# Patient Record
Sex: FEMALE | Race: AMERICAN INDIAN OR ALASKA NATIVE | NOT HISPANIC OR LATINO | Employment: UNEMPLOYED | URBAN - METROPOLITAN AREA
[De-identification: names, ages, dates, MRNs, and addresses within clinical notes are randomized per-mention and may not be internally consistent; named-entity substitution may affect disease eponyms.]

---

## 2019-10-02 PROBLEM — Z80.0 FAMILY HX OF COLON CANCER: Status: ACTIVE | Noted: 2019-10-02

## 2021-02-28 PROBLEM — R07.9 CHEST PAIN: Status: ACTIVE | Noted: 2021-02-28

## 2022-06-07 PROBLEM — Z17.31 HER2-POSITIVE CARCINOMA OF LEFT BREAST: Status: ACTIVE | Noted: 2022-06-07

## 2022-06-07 PROBLEM — C50.912 HER2-POSITIVE CARCINOMA OF LEFT BREAST: Status: ACTIVE | Noted: 2022-06-07

## 2022-06-07 PROBLEM — C50.412 MALIGNANT NEOPLASM OF UPPER-OUTER QUADRANT OF LEFT BREAST IN FEMALE, ESTROGEN RECEPTOR POSITIVE: Status: ACTIVE | Noted: 2022-06-07

## 2022-06-07 PROBLEM — Z17.0 MALIGNANT NEOPLASM OF UPPER-OUTER QUADRANT OF LEFT BREAST IN FEMALE, ESTROGEN RECEPTOR POSITIVE: Status: ACTIVE | Noted: 2022-06-07

## 2022-09-14 PROBLEM — E78.5 HLD (HYPERLIPIDEMIA): Status: ACTIVE | Noted: 2022-09-14

## 2022-09-14 PROBLEM — E03.9 HYPOTHYROID: Status: ACTIVE | Noted: 2022-09-14

## 2022-09-14 PROBLEM — N76.4 LABIAL ABSCESS: Status: ACTIVE | Noted: 2022-09-14

## 2022-09-14 PROBLEM — I10 HTN (HYPERTENSION): Status: ACTIVE | Noted: 2022-09-14

## 2022-09-14 PROBLEM — N17.9 AKI (ACUTE KIDNEY INJURY): Status: ACTIVE | Noted: 2022-09-14

## 2022-09-16 PROBLEM — N17.9 AKI (ACUTE KIDNEY INJURY): Status: RESOLVED | Noted: 2022-09-14 | Resolved: 2022-09-16

## 2022-09-27 PROBLEM — E83.42 HYPOMAGNESEMIA: Status: ACTIVE | Noted: 2022-09-27

## 2022-10-31 ENCOUNTER — TELEPHONE (OUTPATIENT)
Dept: PLASTIC SURGERY | Facility: CLINIC | Age: 67
End: 2022-10-31
Payer: MEDICAID

## 2022-10-31 NOTE — TELEPHONE ENCOUNTER
Attempted to contact pt to schedule consultation.  Pt was not available at the time of call. I left a detailed VM asking pt to call PLS office at her convenience.

## 2022-11-02 ENCOUNTER — DOCUMENTATION ONLY (OUTPATIENT)
Dept: HEMATOLOGY/ONCOLOGY | Facility: CLINIC | Age: 67
End: 2022-11-02
Payer: MEDICAID

## 2022-11-02 ENCOUNTER — PATIENT MESSAGE (OUTPATIENT)
Dept: HEMATOLOGY/ONCOLOGY | Facility: CLINIC | Age: 67
End: 2022-11-02
Payer: MEDICAID

## 2022-11-02 DIAGNOSIS — C50.412 MALIGNANT NEOPLASM OF UPPER-OUTER QUADRANT OF LEFT BREAST IN FEMALE, ESTROGEN RECEPTOR POSITIVE: Primary | ICD-10-CM

## 2022-11-02 DIAGNOSIS — Z17.0 MALIGNANT NEOPLASM OF UPPER-OUTER QUADRANT OF LEFT BREAST IN FEMALE, ESTROGEN RECEPTOR POSITIVE: Primary | ICD-10-CM

## 2022-11-02 NOTE — NURSING
Unable to reach pt by phone, I sent a portal message reviewing the dates, locations, and times of the pt's upcoming appointments.  I provided my direct contact information and encouraged the pt to call for any assistance.    Oncology Navigation   Intake  Date of Diagnosis: 05/13/22  Cancer Type: Breast  Internal / External Referral: External  Date of Referral: 10/24/22  Date Worked: 11/02/22  First Appointment Available: 11/10/22  Appointment Date: 11/10/22  First Available Date vs. Scheduled Date (days): 0     Treatment     Surgical Oncologist: Anne  Plastic Surgeon: Yevgeniy  Consult Date: 11/10/22    Medical Oncologist: Ifeanyi  Chemotherapy: Completed       Procedures: MRI  MRI Schedule Date: 11/09/22       ER: Positive  FL: Positive  Her2: Postive           Acuity      Follow Up  No follow-ups on file.

## 2022-11-09 ENCOUNTER — HOSPITAL ENCOUNTER (OUTPATIENT)
Dept: RADIOLOGY | Facility: OTHER | Age: 67
Discharge: HOME | End: 2022-11-09
Attending: SURGERY
Payer: MEDICAID

## 2022-11-09 DIAGNOSIS — Z17.0 MALIGNANT NEOPLASM OF UPPER-OUTER QUADRANT OF LEFT BREAST IN FEMALE, ESTROGEN RECEPTOR POSITIVE: ICD-10-CM

## 2022-11-09 DIAGNOSIS — C50.412 MALIGNANT NEOPLASM OF UPPER-OUTER QUADRANT OF LEFT BREAST IN FEMALE, ESTROGEN RECEPTOR POSITIVE: ICD-10-CM

## 2022-11-09 PROCEDURE — 77049 MRI BREAST C-+ W/CAD BI: CPT | Mod: TC

## 2022-11-09 PROCEDURE — 25500020 PHARM REV CODE 255: Performed by: SURGERY

## 2022-11-09 PROCEDURE — 77049 MRI BREAST W/WO CONTRAST, W/CAD, BILATERAL: ICD-10-PCS | Mod: 26,,, | Performed by: RADIOLOGY

## 2022-11-09 PROCEDURE — 77049 MRI BREAST C-+ W/CAD BI: CPT | Mod: 26,,, | Performed by: RADIOLOGY

## 2022-11-09 PROCEDURE — A9577 INJ MULTIHANCE: HCPCS | Performed by: SURGERY

## 2022-11-09 RX ADMIN — GADOBENATE DIMEGLUMINE 14 ML: 529 INJECTION, SOLUTION INTRAVENOUS at 11:11

## 2022-11-10 ENCOUNTER — OFFICE VISIT (OUTPATIENT)
Dept: PLASTIC SURGERY | Facility: CLINIC | Age: 67
End: 2022-11-10
Payer: MEDICAID

## 2022-11-10 ENCOUNTER — OFFICE VISIT (OUTPATIENT)
Dept: SURGERY | Facility: CLINIC | Age: 67
End: 2022-11-10
Payer: MEDICAID

## 2022-11-10 VITALS
DIASTOLIC BLOOD PRESSURE: 67 MMHG | HEART RATE: 78 BPM | BODY MASS INDEX: 26.33 KG/M2 | SYSTOLIC BLOOD PRESSURE: 158 MMHG | WEIGHT: 158 LBS | HEIGHT: 65 IN

## 2022-11-10 VITALS
HEART RATE: 76 BPM | BODY MASS INDEX: 26.66 KG/M2 | SYSTOLIC BLOOD PRESSURE: 151 MMHG | DIASTOLIC BLOOD PRESSURE: 67 MMHG | WEIGHT: 160 LBS | HEIGHT: 65 IN

## 2022-11-10 DIAGNOSIS — Z17.0 MALIGNANT NEOPLASM OF UPPER-OUTER QUADRANT OF LEFT BREAST IN FEMALE, ESTROGEN RECEPTOR POSITIVE: ICD-10-CM

## 2022-11-10 DIAGNOSIS — C50.812 MALIGNANT NEOPLASM OF OVERLAPPING SITES OF LEFT BREAST IN FEMALE, ESTROGEN RECEPTOR POSITIVE: Primary | ICD-10-CM

## 2022-11-10 DIAGNOSIS — C50.412 MALIGNANT NEOPLASM OF UPPER-OUTER QUADRANT OF LEFT BREAST IN FEMALE, ESTROGEN RECEPTOR POSITIVE: ICD-10-CM

## 2022-11-10 DIAGNOSIS — Z17.0 MALIGNANT NEOPLASM OF OVERLAPPING SITES OF LEFT BREAST IN FEMALE, ESTROGEN RECEPTOR POSITIVE: Primary | ICD-10-CM

## 2022-11-10 PROCEDURE — 3078F DIAST BP <80 MM HG: CPT | Mod: CPTII,,, | Performed by: SURGERY

## 2022-11-10 PROCEDURE — 3288F PR FALLS RISK ASSESSMENT DOCUMENTED: ICD-10-PCS | Mod: CPTII,,, | Performed by: SURGERY

## 2022-11-10 PROCEDURE — 3008F BODY MASS INDEX DOCD: CPT | Mod: CPTII,,, | Performed by: SURGERY

## 2022-11-10 PROCEDURE — 3077F SYST BP >= 140 MM HG: CPT | Mod: CPTII,,, | Performed by: SURGERY

## 2022-11-10 PROCEDURE — 4010F ACE/ARB THERAPY RXD/TAKEN: CPT | Mod: CPTII,,, | Performed by: SURGERY

## 2022-11-10 PROCEDURE — 4010F PR ACE/ARB THEARPY RXD/TAKEN: ICD-10-PCS | Mod: CPTII,,, | Performed by: SURGERY

## 2022-11-10 PROCEDURE — 99204 OFFICE O/P NEW MOD 45 MIN: CPT | Mod: S$PBB,,, | Performed by: SURGERY

## 2022-11-10 PROCEDURE — 99999 PR PBB SHADOW E&M-EST. PATIENT-LVL III: CPT | Mod: PBBFAC,,, | Performed by: SURGERY

## 2022-11-10 PROCEDURE — 3044F HG A1C LEVEL LT 7.0%: CPT | Mod: CPTII,,, | Performed by: SURGERY

## 2022-11-10 PROCEDURE — 99999 PR PBB SHADOW E&M-EST. PATIENT-LVL III: ICD-10-PCS | Mod: PBBFAC,,, | Performed by: SURGERY

## 2022-11-10 PROCEDURE — 99205 OFFICE O/P NEW HI 60 MIN: CPT | Mod: S$PBB,,, | Performed by: SURGERY

## 2022-11-10 PROCEDURE — 3288F FALL RISK ASSESSMENT DOCD: CPT | Mod: CPTII,,, | Performed by: SURGERY

## 2022-11-10 PROCEDURE — 1126F PR PAIN SEVERITY QUANTIFIED, NO PAIN PRESENT: ICD-10-PCS | Mod: CPTII,,, | Performed by: SURGERY

## 2022-11-10 PROCEDURE — 3078F PR MOST RECENT DIASTOLIC BLOOD PRESSURE < 80 MM HG: ICD-10-PCS | Mod: CPTII,,, | Performed by: SURGERY

## 2022-11-10 PROCEDURE — 99999 PR PBB SHADOW E&M-EST. PATIENT-LVL IV: ICD-10-PCS | Mod: PBBFAC,,, | Performed by: SURGERY

## 2022-11-10 PROCEDURE — 1126F AMNT PAIN NOTED NONE PRSNT: CPT | Mod: CPTII,,, | Performed by: SURGERY

## 2022-11-10 PROCEDURE — 99205 PR OFFICE/OUTPT VISIT, NEW, LEVL V, 60-74 MIN: ICD-10-PCS | Mod: S$PBB,,, | Performed by: SURGERY

## 2022-11-10 PROCEDURE — 3044F PR MOST RECENT HEMOGLOBIN A1C LEVEL <7.0%: ICD-10-PCS | Mod: CPTII,,, | Performed by: SURGERY

## 2022-11-10 PROCEDURE — 99213 OFFICE O/P EST LOW 20 MIN: CPT | Mod: PBBFAC | Performed by: SURGERY

## 2022-11-10 PROCEDURE — 99999 PR PBB SHADOW E&M-EST. PATIENT-LVL IV: CPT | Mod: PBBFAC,,, | Performed by: SURGERY

## 2022-11-10 PROCEDURE — 99204 PR OFFICE/OUTPT VISIT, NEW, LEVL IV, 45-59 MIN: ICD-10-PCS | Mod: S$PBB,,, | Performed by: SURGERY

## 2022-11-10 PROCEDURE — 3077F PR MOST RECENT SYSTOLIC BLOOD PRESSURE >= 140 MM HG: ICD-10-PCS | Mod: CPTII,,, | Performed by: SURGERY

## 2022-11-10 PROCEDURE — 3008F PR BODY MASS INDEX (BMI) DOCUMENTED: ICD-10-PCS | Mod: CPTII,,, | Performed by: SURGERY

## 2022-11-10 PROCEDURE — 1101F PR PT FALLS ASSESS DOC 0-1 FALLS W/OUT INJ PAST YR: ICD-10-PCS | Mod: CPTII,,, | Performed by: SURGERY

## 2022-11-10 PROCEDURE — 1101F PT FALLS ASSESS-DOCD LE1/YR: CPT | Mod: CPTII,,, | Performed by: SURGERY

## 2022-11-10 PROCEDURE — 99214 OFFICE O/P EST MOD 30 MIN: CPT | Mod: PBBFAC,27 | Performed by: SURGERY

## 2022-11-10 NOTE — PROGRESS NOTES
Plastic Surgery History & Physical    SUBJECTIVE:   Chief complaint:  Discuss breast reconstruction    History of Present Illness:  67 y.o. female with left breast cancer.  She initially presented with a palpable breast mass over the summer.  She states that she completed her chemotherapy about 1 month ago.  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  She otherwise states that she tolerated chemotherapy fairly well.  She is seeing Dr. Rodríguez later today to discuss her surgical options.  All of her care at this point has been done in [place].  She also complains of bilateral axillary breast tissue.  I told her I would address that at the time of reconstruction is more incisions could compromise the breast skin at the time of reconstruction    Past medical history includes diabetes.  Her last hemoglobin A1c was 6.  She also has hypertension and coronary artery disease.  She takes a baby aspirin daily.  She states that she has a stent placed after heart catheterization for symptoms about 18 months ago.    Past surgical history includes thyroidectomy, cholecystectomy, bladder suspension.  She denies any nicotine use    Past Medical History:   Diagnosis Date    Angina pectoris     Breast cancer     Diabetes     Diverticulosis     GERD (gastroesophageal reflux disease)     History of postoperative nausea and vomiting     Hypertension     Palpitation     PONV (postoperative nausea and vomiting)     Sleep apnea     Thyroid disease        Past Surgical History:   Procedure Laterality Date    BLADDER SUSPENSION      CHOLECYSTECTOMY      [place]    [facility] [place] poly[place] per patient    COLONOSCOPY N/A 10/02/2019    Procedure: COLONOSCOPY;  Surgeon: Nilsa Stanley MD;  Location: [facility];  Service: Endoscopy;  Laterality: N/A;    ESOPHAGOGASTRODUODENOSCOPY      ESOPHAGUS SURGERY      nissen    HIATAL HERNIA REPAIR       INSERTION OF TUNNELED CENTRAL VENOUS CATHETER (CVC) WITH SUBCUTANEOUS PORT Right 2022    Procedure: [id number];  Surgeon: Simon Moses MD;  Location: [facility];  Service: General;  Laterality: Right;  right IJ    PYLOROPLASTY      THYROIDECTOMY         Family History   Problem Relation Age of Onset    Colon cancer Sister         Initial dx-30's-  @ age 53 of metastatic colon cancer        Social History     Socioeconomic History    Marital status:    Tobacco Use    Smoking status: Never    Smokeless tobacco: Never   Substance and Sexual Activity    Alcohol use: No    Drug use: No       Current Outpatient Medications   Medication Sig Dispense Refill    aspirin 81 MG Chew Take 1 tablet (81 mg total) by mouth once daily. 30 tablet 0    dexAMETHasone (DECADRON) 4 MG Tab Take 1 tablet (4 mg total) by mouth 2 (two) times a day. Take 2 tabs day 2, 3 after chemotherapy (Patient not taking: Reported on 11/10/2022) 24 tablet 1    diphenoxylate-atropine 2.5-0.025 mg (LOMOTIL) 2.5-0.025 mg per tablet Take 1 tablet by mouth 2 (two) times daily as needed for Diarrhea (For diarrhea unimproved by imodium). (Patient not taking: Reported on 11/10/2022) 30 tablet 1    levothyroxine (SYNTHROID) 75 MCG tablet Take 75 mcg by mouth once daily.      lisinopriL-hydrochlorothiazide (ZESTORETIC) 10-12.5 mg per tablet Take 1 tablet by mouth once daily. 30 tablet 11    magnesium oxide (MAGOX) 400 mg (241.3 mg magnesium) tablet Take 1 tablet (400 mg total) by mouth 2 (two) times daily. 200 tablet 1    metformin (GLUCOPHAGE-XR) 500 MG 24 hr tablet Take 500 mg by mouth 2 (two) times a day.       metoprolol succinate (TOPROL-XL) 100 MG 24 hr tablet Take 50 mg by mouth once daily.      nitroGLYCERIN (NITROSTAT) 0.4 MG SL tablet Place 0.4 mg under the tongue every 5 (five) minutes as needed for Chest pain.      ondansetron (ZOFRAN-ODT) 8 MG TbDL Take 1 tablet (8 mg total) by mouth every 8 (eight) hours as needed  "(nausea/vomiting). 60 tablet 5    oxyCODONE (OXY-IR) 5 mg Cap Take 1 capsule (5 mg total) by mouth every 6 (six) hours as needed for Pain. (Patient not taking: Reported on 11/10/2022) 20 capsule 0    pantoprazole (PROTONIX) 40 MG tablet Take 40 mg by mouth once daily.      vitamin D (VITAMIN D3) 1000 units Tab Take 5,000 Units by mouth once daily.      atorvastatin (LIPITOR) 20 MG tablet Take 1 tablet (20 mg total) by mouth once daily. (Patient not taking: Reported on 10/31/2022) 90 tablet 3     No current facility-administered medications for this visit.       Review of patient's allergies indicates:   Allergen Reactions    Adhesive tape-silicones      Other reaction(s): Propensity to adverse reactions to drug    Pravastatin      Other reaction(s): Propensity to adverse reactions to drug         Review of Systems:  Review of Systems   Respiratory:  Negative for chest tightness and shortness of breath.    Cardiovascular:  Negative for chest pain and palpitations.   Neurological:  Positive for numbness.         OBJECTIVE:     BP (!) 158/67 (BP Location: Right arm, Patient Position: Sitting, BP Method: Medium (Automatic))   Pulse 78   Ht 5' 5" (1.651 m)   Wt 71.7 kg (158 lb)   BMI 26.29 kg/m²       Physical Exam:  Gen: NAD, appears stated age  Neuro: normal without focal findings, mental status and speech normal  HEENT: NCAT, neck supple, PEERL  CV: RRR  Pulm: Breathing non-labored, chest wall movement equal bilaterally   Breast:  Bilateral grade 3 ptosis.  No palpable masses.  Bilateral ptotic axillary breast tissue   Right Left   SN-N 31 29   N-IMF 10 10   N-N 16   BW         Abdomen: soft, nontender, no guarding  Gu: genitalia not examined  Extremity:normal strength, no cyanosis or edema  Skin: Skin color, texture, turgor normal. No rashes or lesions  Psych: oriented to time, place and person, mood and affect are within normal limits          ASSESSMENT/PLAN:     Malignant neoplasm of upper-outer quadrant of " left breast in female, estrogen receptor positive    Began with discussion of implant based reconstruction. This included both direct-to-implant, tissue expander based reconstruction, the possible use of ADM, drains and expected post-operative course.  Risks of implant based recon including: hematoma, seroma, infection, extrusion, capsular contracture, risks of rupture, need for replacement later in life and BII/MARIMAR-ALCL. Discussed basics of revision procedures including: tailoring of the skin envelope, repositioning of the implant, liposuction and fat grafting. Any questions answered.    Next we discussed autologous reconstruction with the most common donor sites (abdomen, thigh, back). Explained that given her heart history I would not recommend autologous free flap breast reconstruction.  The reason being that the vessel needed for left breast reconstruction is also a good vessel in the event that she later needs a bypass.  She would a stent placed about 18 months ago.    Finally, we discussed oncoplastic breast reduction with typical postop pretty course and risks of breast reduction.     I recommended 1 of 2 options.  She would like a mastectomy she did have a implant based breast reconstruction, beginning with a tissue expander for skin reduction and a contralateral breast reduction.  She would also be a candidate for oncologic breast reduction.  She is seeing Dr. Rodríguez later today and will consider her reconstructive options.      Marquis Vuong, DO  Plastic and Reconstructive Surgery    I spent a total of 45 minutes on the day of the visit.This includes face to face time and non-face to face time preparing to see the patient (eg, review of tests), obtaining and/or reviewing separately obtained history, documenting clinical information in the electronic or other health record, independently interpreting results and communicating results to the patient/family/caregiver, or care coordinator.

## 2022-11-10 NOTE — PROGRESS NOTES
Breast Surgery  [facility]  Department of Surgery      REFERRING PROVIDER: Aaareferral Self  No address on file    Chief Complaint: Breast Mass (NP Post chemo from [facility] )      Subjective:      Patient ID: Ermias Herman is a 67 y.o. female who presents with triple + IDC of the left breast. All work up performed at [facility].     Patient states this issue was first found as a palpable abnormality on self exam. Diagnostic mammogram/US (5/3/22) showed pleomorphic calcifications as well a 3 cm area of asymmetry which corresponded to patient's palpable finding. A ultrasound guided biopsy was performed on 22 with pathology revealing infiltrating ductal carcinoma of the breast, triple positive.     Patient underwent NACT at [facility] with [facility] from 22 - 10/18/22. Patient was referred to our clinic to discuss surgical options. Patient met with Dr. Vuong in plastic surgery prior to our visit.     Patient has now completed chemotherapy. Patient states the palpable mass on self exam has improved. Patient denies nipple discharge. Patient denies to previous breast biopsy. Patient denies a personal history of breast cancer.    Findings at that time were the following:   Tumor size: 3 cm   Estrogen Receptor: +   Progesterone Receptor: +   Her-2 kassie: +   Lymph node status: clinically negative    Lymphatic invasion: not identified     GYN History:  Age of menarche was 12.   Age of menopause was 52.    Patient denies hormonal therapy.   Patient is .   Age of first live birth was 23.   Patient did not use hormone therapy   Patient did not breast feed.    Past Medical History:   Diagnosis Date    Angina pectoris     Breast cancer     Diabetes     Diverticulosis     GERD (gastroesophageal reflux disease)     History of postoperative nausea and vomiting     Hypertension     Palpitation     PONV (postoperative nausea and vomiting)     Sleep apnea     Thyroid disease      Past Surgical History:   Procedure  Laterality Date    BLADDER SUSPENSION      CHOLECYSTECTOMY      [place]    [facility] [place]    COLONOSCOPY N/A 10/02/2019    Procedure: COLONOSCOPY;  Surgeon: Nilsa Stanley MD;  Location: [facility];  Service: Endoscopy;  Laterality: N/A;    ESOPHAGOGASTRODUODENOSCOPY      ESOPHAGUS SURGERY      nissen    HIATAL HERNIA REPAIR      INSERTION OF TUNNELED CENTRAL VENOUS CATHETER (CVC) WITH SUBCUTANEOUS PORT Right 6/21/2022    Procedure: [id number];  Surgeon: Simon Moses MD;  Location: [facility];  Service: General;  Laterality: Right;  right IJ    PYLOROPLASTY      THYROIDECTOMY       Current Outpatient Medications on File Prior to Visit   Medication Sig Dispense Refill    aspirin 81 MG Chew Take 1 tablet (81 mg total) by mouth once daily. 30 tablet 0    levothyroxine (SYNTHROID) 75 MCG tablet Take 75 mcg by mouth once daily.      lisinopriL-hydrochlorothiazide (ZESTORETIC) 10-12.5 mg per tablet Take 1 tablet by mouth once daily. 30 tablet 11    magnesium oxide (MAGOX) 400 mg (241.3 mg magnesium) tablet Take 1 tablet (400 mg total) by mouth 2 (two) times daily. 200 tablet 1    metformin (GLUCOPHAGE-XR) 500 MG 24 hr tablet Take 500 mg by mouth 2 (two) times a day.       metoprolol succinate (TOPROL-XL) 100 MG 24 hr tablet Take 50 mg by mouth once daily.      nitroGLYCERIN (NITROSTAT) 0.4 MG SL tablet Place 0.4 mg under the tongue every 5 (five) minutes as needed for Chest pain.      ondansetron (ZOFRAN-ODT) 8 MG TbDL Take 1 tablet (8 mg total) by mouth every 8 (eight) hours as needed (nausea/vomiting). 60 tablet 5    pantoprazole (PROTONIX) 40 MG tablet Take 40 mg by mouth once daily.      vitamin D (VITAMIN D3) 1000 units Tab Take 5,000 Units by mouth once daily.      atorvastatin (LIPITOR) 20 MG tablet Take 1 tablet (20 mg total) by mouth once daily. (Patient not taking: Reported on 10/31/2022) 90 tablet 3    dexAMETHasone (DECADRON) 4 MG Tab Take 1 tablet (4  "mg total) by mouth 2 (two) times a day. Take 2 tabs day 2, 3 after chemotherapy (Patient not taking: Reported on 11/10/2022) 24 tablet 1    diphenoxylate-atropine 2.5-0.025 mg (LOMOTIL) 2.5-0.025 mg per tablet Take 1 tablet by mouth 2 (two) times daily as needed for Diarrhea (For diarrhea unimproved by imodium). (Patient not taking: Reported on 11/10/2022) 30 tablet 1    oxyCODONE (OXY-IR) 5 mg Cap Take 1 capsule (5 mg total) by mouth every 6 (six) hours as needed for Pain. (Patient not taking: Reported on 11/10/2022) 20 capsule 0     No current facility-administered medications on file prior to visit.     Social History     Socioeconomic History    Marital status:    Tobacco Use    Smoking status: Never    Smokeless tobacco: Never   Substance and Sexual Activity    Alcohol use: No    Drug use: No     Family History   Problem Relation Age of Onset    Colon cancer Sister         Initial dx-30's-  @ age 53 of metastatic colon cancer         Review of Systems   Constitutional:  Negative for appetite change, chills, fever and unexpected weight change.   HENT:  Negative for facial swelling, postnasal drip and sore throat.    Eyes:  Negative for redness and itching.   Respiratory:  Negative for chest tightness and shortness of breath.    Cardiovascular:  Negative for chest pain and palpitations.   Gastrointestinal:  Negative for blood in stool, diarrhea, nausea and vomiting.   Genitourinary:  Negative for difficulty urinating and dysuria.   Musculoskeletal:  Negative for arthralgias and joint swelling.   Skin:  Negative for rash and wound.   Neurological:  Negative for dizziness and syncope.   Hematological:  Negative for adenopathy.   Psychiatric/Behavioral:  Negative for agitation. The patient is not nervous/anxious.    Objective:   BP (!) 151/67 (BP Location: Right arm, Patient Position: Sitting, BP Method: Small (Automatic))   Pulse 76   Ht 5' 5" (1.651 m)   Wt 72.6 kg (160 lb)   BMI 26.63 kg/m² "     Physical Exam   Vitals reviewed.  Constitutional: She is oriented to person, place, and time.   HENT:   Head: Normocephalic and atraumatic.   Nose: Nose normal.   Eyes: Pupils are equal, round, and reactive to light. Right eye exhibits no discharge. Left eye exhibits no discharge.   Pulmonary/Chest: Effort normal and breath sounds normal. No stridor. No respiratory distress. She exhibits no mass, no tenderness and no edema. Right breast exhibits no inverted nipple, no mass, no nipple discharge, no skin change and no tenderness. Left breast exhibits no inverted nipple, no mass, no nipple discharge, no skin change and no tenderness. No breast swelling or bleeding. Breasts are symmetrical.       Abdominal: Normal appearance.   Genitourinary: No breast swelling or bleeding.   Neurological: She is alert and oriented to person, place, and time.   Skin: Skin is warm and dry.     Psychiatric: Her behavior is normal. Mood, judgment and thought content normal.     Radiology review: Images personally reviewed by me in the clinic.     11/9/22 MRI Breast:     Findings:  The breasts have heterogeneous fibroglandular tissue. The background parenchymal enhancement is moderate.      Left  At the site of the known cancer, there is a heterogeneous, faint area of focal non-mass enhancement in the upper outer quadrant of the left breast at 1 o'clock in the middle depth, 6 cm from the nipple, 4.3 cm from the skin, and 5.9 cm from the chest wall. There is also a 7 x 8 x 6 mm oval rim enhancing mass in the left breast upper inner quadrant middle depth. This does not demonstrate central enhancement.  Kinetics initial phase is slow. Delayed phase is persistent. Based on the notes in the EHR, the patient has elected to undergo left mastectomy.      Right  There is a 4 mm x 14 mm x 9 mm non-mass enhancement in a linear distribution seen in the right breast at 6 o'clock in the anterior depth, 3.2 cm from the nipple, 3 cm from the skin, and  8.5 cm from the chest wall. Kinetics initial phase is fast. Delayed phase is washout.      There is a 5 mm x 5 mm x 7 mm round mass with circumscribed margins and rim enhancement seen in the upper outer quadrant of the right breast at 7 o'clock in the middle depth, 4.3 cm from the nipple, 3.8 cm from the skin, and 8.9 cm from the chest wall. Kinetics initial phase is slow. Delayed phase is persistent. The enhancement is central.      No enhancement of the skin or pectoral muscles is seen. No axillary adenopathy.      Impression:  Left  Non-mass Enhancement: Left breast non-mass enhancement at the upper outer middle 1 o'clock position. Assessment: 6 - Known biopsy, proven malignancy.      Right  Non-mass Enhancement: Right breast 4 mm x 14 mm x 9 mm non-mass enhancement at the anterior 6 o'clock position. Assessment: 4 - Suspicious finding. Biopsy is recommended.   Mass: Right breast 5 mm x 5 mm x 7 mm mass at the upper outer middle 7 o'clock position. Assessment: 4 - Suspicious finding.      BI-RADS Category:   Overall: 4 - Suspicious      Assessment:       1. Malignant neoplasm of overlapping sites of left breast in female, estrogen receptor positive        Plan:     Options for management were discussed with the patient and her family. We reviewed the existing data noting the equivalency of breast conserving surgery with radiation therapy and mastectomy. We also reviewed the guidelines of the National Comprehensive Cancer Network for breast carcinoma. We discussed the need for lumpectomy margins to be negative for carcinoma, the necessity for postoperative radiation therapy after breast conservation in most cases, the possibility of a failed or false negative sentinel lymph node biopsy and the potential need for complete lymphadenectomy for a failed or positive sentinel lymph node biopsy were fully discussed. In the setting of mastectomy, delayed or immediate reconstruction options are available and were  discussed.     Post chemo MRI showed additional findings in the right breast. No pre chemo MRI for comparison. Dr. Vuong met with patient. Not a candidate for autologous. Patient planning no reconstruction now. Patient opts out of additional MR guided biopsy of new NME of the right breast. Will return to [facility] for bilateral mastectomy, bilateral node eval. This would be easier on her.    We also discussed the role of systemic therapy in the treatment of early stage breast cancer.  We discussed that this is based on tumor biology and huey status and will be determined based on final pathology.  We discussed that if the cancer is hormone positive, endocrine therapy would be recommended in most cases and its use can reduce the risk of recurrence as well as improve survival. Side effects of treatment were briefly discussed. We also discussed the potential role for chemotherapy based on a number of factors such as tumor phenotype (ER+ vs. triple negative vs. [id number]+) and this would be determined in coordination with the medical oncologist.      Patient was educated on breast cancer, receptors, wire localization lumpectomy, mastectomy, sentinel lymph node mapping and biopsy, axillary lymph node dissection, reconstruction, breast prosthesis with post-mastectomy bra and radiation therapy. Patient was given patient information binder including [facility] breast cancer treatment brochure.  All her questions were answered.        Total time spent with the patient: 65.  45 minutes of face to face consultation and 20 minutes of chart review and coordination of care.

## 2022-11-11 ENCOUNTER — TELEPHONE (OUTPATIENT)
Dept: RADIOLOGY | Facility: HOSPITAL | Age: 67
End: 2022-11-11
Payer: MEDICAID

## 2022-11-11 ENCOUNTER — DOCUMENTATION ONLY (OUTPATIENT)
Dept: HEMATOLOGY/ONCOLOGY | Facility: CLINIC | Age: 67
End: 2022-11-11
Payer: MEDICAID

## 2022-11-11 NOTE — TELEPHONE ENCOUNTER
Spoke with patient to schedule MRI guided breast biopsies of the right breast. Per the patient she is going to have a bilateral mastectomy and does not want to schedule additional biopsies.

## 2022-11-11 NOTE — ASSESSMENT & PLAN NOTE
Began with discussion of implant based reconstruction. This included both direct-to-implant, tissue expander based reconstruction, the possible use of ADM, drains and expected post-operative course.  Risks of implant based recon including: hematoma, seroma, infection, extrusion, capsular contracture, risks of rupture, need for replacement later in life and BII/MARIMAR-ALCL. Discussed basics of revision procedures including: tailoring of the skin envelope, repositioning of the implant, liposuction and fat grafting. Any questions answered.    Next we discussed autologous reconstruction with the most common donor sites (abdomen, thigh, back). Explained that given her heart history I would not recommend autologous free flap breast reconstruction.  The reason being that the vessel needed for left breast reconstruction is also a good vessel in the event that she later needs a bypass.  She would a stent placed about 18 months ago.    Finally, we discussed oncoplastic breast reduction with typical postop pretty course and risks of breast reduction.     I recommended 1 of 2 options.  She would like a mastectomy she did have a implant based breast reconstruction, beginning with a tissue expander for skin reduction and a contralateral breast reduction.  She would also be a candidate for oncologic breast reduction.  She is seeing Dr. Rodríguez later today and will consider her reconstructive options.

## 2023-04-13 PROBLEM — L98.7 EXCESSIVE SKIN AND SUBCUTANEOUS TISSUE: Status: ACTIVE | Noted: 2023-04-13

## 2023-05-10 PROBLEM — M85.89 OSTEOPENIA OF MULTIPLE SITES: Status: ACTIVE | Noted: 2023-05-10

## 2023-05-10 PROBLEM — M85.851 OSTEOPENIA OF BOTH HIPS: Status: ACTIVE | Noted: 2023-05-10

## 2023-05-10 PROBLEM — M85.852 OSTEOPENIA OF BOTH HIPS: Status: ACTIVE | Noted: 2023-05-10

## 2023-05-10 PROBLEM — Z79.811: Status: ACTIVE | Noted: 2023-05-10

## 2025-03-05 DIAGNOSIS — E11.9 TYPE 2 DIABETES MELLITUS WITHOUT COMPLICATION: ICD-10-CM

## 2025-03-31 ENCOUNTER — PATIENT MESSAGE (OUTPATIENT)
Dept: ADMINISTRATIVE | Facility: HOSPITAL | Age: 70
End: 2025-03-31

## 2025-04-30 DIAGNOSIS — E11.9 TYPE 2 DIABETES MELLITUS WITHOUT COMPLICATION: ICD-10-CM
